# Patient Record
Sex: FEMALE | Race: WHITE | NOT HISPANIC OR LATINO | URBAN - METROPOLITAN AREA
[De-identification: names, ages, dates, MRNs, and addresses within clinical notes are randomized per-mention and may not be internally consistent; named-entity substitution may affect disease eponyms.]

---

## 2020-07-25 ENCOUNTER — TELEPHONE ENCOUNTER (OUTPATIENT)
Dept: URBAN - METROPOLITAN AREA CLINIC 13 | Facility: CLINIC | Age: 65
End: 2020-07-25

## 2020-07-25 RX ORDER — POLYETHYLENE GLYCOL 3350, SODIUM CHLORIDE, SODIUM BICARBONATE AND POTASSIUM CHLORIDE WITH LEMON FLAVOR 420; 11.2; 5.72; 1.48 G/4L; G/4L; G/4L; G/4L
TAKE 1/2 GALLON AT 5:00 PM DAY BEFORE PROCEDURE, TAKE SECOND 1/2 OF GALLON 6 HRS PRIOR TO PROCEDURE POWDER, FOR SOLUTION ORAL
Qty: 1 | Refills: 0 | OUTPATIENT
Start: 2018-03-19 | End: 2018-05-07

## 2020-07-25 RX ORDER — BEPOTASTINE BESILATE 15 MG/ML
SOLUTION/ DROPS OPHTHALMIC
Refills: 0 | OUTPATIENT
Start: 2013-01-28 | End: 2013-02-15

## 2020-07-25 RX ORDER — TRAVOPROST 0.04 MG/ML
INSTILL 1 DROP IN BOTH EYES AT BEDTIME SOLUTION/ DROPS OPHTHALMIC
Refills: 0 | OUTPATIENT
Start: 2013-01-28 | End: 2018-03-19

## 2020-07-26 ENCOUNTER — TELEPHONE ENCOUNTER (OUTPATIENT)
Dept: URBAN - METROPOLITAN AREA CLINIC 13 | Facility: CLINIC | Age: 65
End: 2020-07-26

## 2020-07-26 RX ORDER — BIMATOPROST 0.1 MG/ML
INSTILL 1 DROP BEDTIME SOLUTION/ DROPS OPHTHALMIC
Refills: 0 | Status: ACTIVE | COMMUNITY
Start: 2018-03-19

## 2020-07-26 RX ORDER — FEXOFENADINE HYDROCHLORIDE 60 MG/1
TAKE 1 TABLET DAILY TABLET, FILM COATED ORAL
Refills: 0 | Status: ACTIVE | COMMUNITY
Start: 2018-03-19

## 2020-07-26 RX ORDER — TRAVOPROST 0.04 MG/ML
INSTILL 1 DROP INTO BOTH EYES EVERY NIGHT AT BEDTIME SOLUTION/ DROPS OPHTHALMIC
Qty: 3 | Refills: 0 | Status: ACTIVE | COMMUNITY
Start: 2012-09-06

## 2020-07-26 RX ORDER — ACETAMINOPHEN 500 MG/1
TAKE 1 TABLET EVERY 4 TO 6 HOURS AS NEEDED TABLET, FILM COATED ORAL
Refills: 0 | Status: ACTIVE | COMMUNITY
Start: 2018-03-19

## 2020-07-26 RX ORDER — CYCLOSPORINE 0.5 MG/ML
INSTILL 1 DROP IN EACH EYE TWICE DAILY EMULSION OPHTHALMIC
Refills: 0 | Status: ACTIVE | COMMUNITY
Start: 2018-03-19

## 2020-07-26 RX ORDER — TOBRAMYCIN AND DEXAMETHASONE 3; 1 MG/G; MG/G
APPLY 1/4 INCH TO THE LASH LINE OF BOTH EYES WITH A FINGERTIP AT BEDTI OINTMENT OPHTHALMIC
Qty: 4 | Refills: 0 | Status: ACTIVE | COMMUNITY
Start: 2012-10-03

## 2020-07-26 RX ORDER — DORZOLAMIDE HYDROCHLORIDE 20 MG/ML
INSTILL 1 DROP DAILY SOLUTION OPHTHALMIC
Refills: 0 | Status: ACTIVE | COMMUNITY
Start: 2018-03-19

## 2020-07-26 RX ORDER — BRIMONIDINE TARTRATE, TIMOLOL MALEATE 2; 5 MG/ML; MG/ML
INSTILL 1 DROP INTO BOTH EYES TWICE A DAY SOLUTION/ DROPS OPHTHALMIC
Qty: 5 | Refills: 0 | Status: ACTIVE | COMMUNITY
Start: 2012-08-23

## 2020-07-26 RX ORDER — CEPHALEXIN 500 MG/1
TAKE ONE CAPSULE BY MOUTH 4 TIMES A DAY FOR 10 DAYS ,THEN 1 CAPSULE TW CAPSULE ORAL
Qty: 50 | Refills: 0 | Status: ACTIVE | COMMUNITY
Start: 2012-05-17

## 2020-07-26 RX ORDER — MULTIVITAMIN
TAKE 1 CAPSULE DAILY CAPSULE ORAL
Refills: 0 | Status: ACTIVE | COMMUNITY
Start: 2018-03-19

## 2023-12-12 ENCOUNTER — OFFICE VISIT (OUTPATIENT)
Dept: URBAN - METROPOLITAN AREA SURGERY CENTER 25 | Facility: SURGERY CENTER | Age: 68
End: 2023-12-12

## 2024-01-10 ENCOUNTER — LAB OUTSIDE AN ENCOUNTER (OUTPATIENT)
Dept: URBAN - METROPOLITAN AREA CLINIC 113 | Facility: CLINIC | Age: 69
End: 2024-01-10

## 2024-01-10 ENCOUNTER — OFFICE VISIT (OUTPATIENT)
Dept: URBAN - METROPOLITAN AREA CLINIC 113 | Facility: CLINIC | Age: 69
End: 2024-01-10
Payer: MEDICARE

## 2024-01-10 ENCOUNTER — DASHBOARD ENCOUNTERS (OUTPATIENT)
Age: 69
End: 2024-01-10

## 2024-01-10 VITALS
SYSTOLIC BLOOD PRESSURE: 187 MMHG | BODY MASS INDEX: 33.19 KG/M2 | WEIGHT: 194.4 LBS | RESPIRATION RATE: 20 BRPM | TEMPERATURE: 97.5 F | HEART RATE: 66 BPM | HEIGHT: 64 IN | DIASTOLIC BLOOD PRESSURE: 82 MMHG

## 2024-01-10 DIAGNOSIS — Z86.010 HISTORY OF ADENOMATOUS POLYP OF COLON: ICD-10-CM

## 2024-01-10 PROBLEM — 429047008: Status: ACTIVE | Noted: 2024-01-10

## 2024-01-10 PROCEDURE — 99202 OFFICE O/P NEW SF 15 MIN: CPT | Performed by: NURSE PRACTITIONER

## 2024-01-10 RX ORDER — CEPHALEXIN 500 MG/1
TAKE ONE CAPSULE BY MOUTH 4 TIMES A DAY FOR 10 DAYS ,THEN 1 CAPSULE TW CAPSULE ORAL
Qty: 50 | Refills: 0 | COMMUNITY
Start: 2012-05-17

## 2024-01-10 RX ORDER — MULTIVITAMIN
TAKE 1 CAPSULE DAILY CAPSULE ORAL
Refills: 0 | Status: ACTIVE | COMMUNITY
Start: 2018-03-19

## 2024-01-10 RX ORDER — ROSUVASTATIN CALCIUM 10 MG/1
1 TABLET TABLET, COATED ORAL ONCE A DAY
Status: ACTIVE | COMMUNITY

## 2024-01-10 RX ORDER — TOBRAMYCIN AND DEXAMETHASONE 3; 1 MG/G; MG/G
APPLY 1/4 INCH TO THE LASH LINE OF BOTH EYES WITH A FINGERTIP AT BEDTI OINTMENT OPHTHALMIC
Qty: 4 | Refills: 0 | COMMUNITY
Start: 2012-10-03

## 2024-01-10 RX ORDER — DORZOLAMIDE HYDROCHLORIDE 20 MG/ML
INSTILL 1 DROP DAILY SOLUTION OPHTHALMIC
Refills: 0 | COMMUNITY
Start: 2018-03-19

## 2024-01-10 RX ORDER — BIMATOPROST 0.1 MG/ML
INSTILL 1 DROP BEDTIME SOLUTION/ DROPS OPHTHALMIC
Refills: 0 | COMMUNITY
Start: 2018-03-19

## 2024-01-10 RX ORDER — CYCLOSPORINE 0.5 MG/ML
INSTILL 1 DROP IN EACH EYE TWICE DAILY EMULSION OPHTHALMIC
Refills: 0 | Status: ACTIVE | COMMUNITY
Start: 2018-03-19

## 2024-01-10 RX ORDER — ACETAMINOPHEN 500 MG/1
TAKE 1 TABLET EVERY 4 TO 6 HOURS AS NEEDED TABLET, FILM COATED ORAL
Refills: 0 | Status: ACTIVE | COMMUNITY
Start: 2018-03-19

## 2024-01-10 RX ORDER — FEXOFENADINE HYDROCHLORIDE 60 MG/1
TAKE 1 TABLET DAILY TABLET, FILM COATED ORAL
Refills: 0 | Status: ACTIVE | COMMUNITY
Start: 2018-03-19

## 2024-01-10 RX ORDER — NETARSUDIL AND LATANOPROST OPHTHALMIC SOLUTION, 0.02%/0.005% .2; .05 MG/ML; MG/ML
1 DROP INTO AFFECTED EYE SOLUTION/ DROPS OPHTHALMIC; TOPICAL ONCE A DAY
Status: ACTIVE | COMMUNITY

## 2024-01-10 RX ORDER — BRIMONIDINE TARTRATE, TIMOLOL MALEATE 2; 5 MG/ML; MG/ML
INSTILL 1 DROP INTO BOTH EYES TWICE A DAY SOLUTION/ DROPS OPHTHALMIC
Qty: 5 | Refills: 0 | Status: ACTIVE | COMMUNITY
Start: 2012-08-23

## 2024-01-10 RX ORDER — ESTRADIOL 0.5 MG/1
1 TABLET TABLET ORAL ONCE A DAY
Status: ACTIVE | COMMUNITY

## 2024-01-10 RX ORDER — TRAVOPROST 0.04 MG/ML
INSTILL 1 DROP INTO BOTH EYES EVERY NIGHT AT BEDTIME SOLUTION/ DROPS OPHTHALMIC
Qty: 3 | Refills: 0 | COMMUNITY
Start: 2012-09-06

## 2024-01-10 RX ORDER — POLYETHYLENE GLYCOL 3350, SODIUM CHLORIDE, SODIUM BICARBONATE, POTASSIUM CHLORIDE 420; 11.2; 5.72; 1.48 G/4L; G/4L; G/4L; G/4L
AS DIRECTED POWDER, FOR SOLUTION ORAL
Qty: 1 BOTTLE | Refills: 0 | OUTPATIENT
Start: 2024-01-10 | End: 2024-01-11

## 2024-01-10 NOTE — HPI-TODAY'S VISIT:
This is a 68-year-old woman with a history of hyperlipidemia, diverticulosis, and adenomatous colon polyps due surveillance in May 2023 referred from Dr. Dawson for screening colonoscopy.  She was last seen in the office 5/25/2018 for follow-up after colonoscopy notable for removal of a 5 mm adenoma, sigmoid diverticulosis, and grade 1 internal hemorrhoids.  She was to begin daily fiber and return in 2023 for a surveillance colonoscopy.  Since her last visit, she had bilateral cataract surgery.  She denies other changes in her medical or surgical history.  She denies any abdominal symptoms.

## 2024-01-19 ENCOUNTER — OFFICE VISIT (OUTPATIENT)
Dept: URBAN - METROPOLITAN AREA SURGERY CENTER 25 | Facility: SURGERY CENTER | Age: 69
End: 2024-01-19

## 2024-02-29 ENCOUNTER — COLON (OUTPATIENT)
Dept: URBAN - METROPOLITAN AREA SURGERY CENTER 25 | Facility: SURGERY CENTER | Age: 69
End: 2024-02-29

## 2024-02-29 RX ORDER — MULTIVITAMIN
TAKE 1 CAPSULE DAILY CAPSULE ORAL
Refills: 0 | Status: ACTIVE | COMMUNITY
Start: 2018-03-19

## 2024-02-29 RX ORDER — FEXOFENADINE HYDROCHLORIDE 60 MG/1
TAKE 1 TABLET DAILY TABLET, FILM COATED ORAL
Refills: 0 | Status: ACTIVE | COMMUNITY
Start: 2018-03-19

## 2024-02-29 RX ORDER — BIMATOPROST 0.1 MG/ML
INSTILL 1 DROP BEDTIME SOLUTION/ DROPS OPHTHALMIC
Refills: 0 | COMMUNITY
Start: 2018-03-19

## 2024-02-29 RX ORDER — DORZOLAMIDE HYDROCHLORIDE 20 MG/ML
INSTILL 1 DROP DAILY SOLUTION OPHTHALMIC
Refills: 0 | COMMUNITY
Start: 2018-03-19

## 2024-02-29 RX ORDER — ROSUVASTATIN CALCIUM 10 MG/1
1 TABLET TABLET, COATED ORAL ONCE A DAY
Status: ACTIVE | COMMUNITY

## 2024-02-29 RX ORDER — CYCLOSPORINE 0.5 MG/ML
INSTILL 1 DROP IN EACH EYE TWICE DAILY EMULSION OPHTHALMIC
Refills: 0 | Status: ACTIVE | COMMUNITY
Start: 2018-03-19

## 2024-02-29 RX ORDER — TRAVOPROST 0.04 MG/ML
INSTILL 1 DROP INTO BOTH EYES EVERY NIGHT AT BEDTIME SOLUTION/ DROPS OPHTHALMIC
Qty: 3 | Refills: 0 | COMMUNITY
Start: 2012-09-06

## 2024-02-29 RX ORDER — NETARSUDIL AND LATANOPROST OPHTHALMIC SOLUTION, 0.02%/0.005% .2; .05 MG/ML; MG/ML
1 DROP INTO AFFECTED EYE SOLUTION/ DROPS OPHTHALMIC; TOPICAL ONCE A DAY
Status: ACTIVE | COMMUNITY

## 2024-02-29 RX ORDER — ACETAMINOPHEN 500 MG/1
TAKE 1 TABLET EVERY 4 TO 6 HOURS AS NEEDED TABLET, FILM COATED ORAL
Refills: 0 | Status: ACTIVE | COMMUNITY
Start: 2018-03-19

## 2024-02-29 RX ORDER — ESTRADIOL 0.5 MG/1
1 TABLET TABLET ORAL ONCE A DAY
Status: ACTIVE | COMMUNITY

## 2024-02-29 RX ORDER — TOBRAMYCIN AND DEXAMETHASONE 3; 1 MG/G; MG/G
APPLY 1/4 INCH TO THE LASH LINE OF BOTH EYES WITH A FINGERTIP AT BEDTI OINTMENT OPHTHALMIC
Qty: 4 | Refills: 0 | COMMUNITY
Start: 2012-10-03

## 2024-02-29 RX ORDER — CEPHALEXIN 500 MG/1
TAKE ONE CAPSULE BY MOUTH 4 TIMES A DAY FOR 10 DAYS ,THEN 1 CAPSULE TW CAPSULE ORAL
Qty: 50 | Refills: 0 | COMMUNITY
Start: 2012-05-17

## 2024-02-29 RX ORDER — BRIMONIDINE TARTRATE, TIMOLOL MALEATE 2; 5 MG/ML; MG/ML
INSTILL 1 DROP INTO BOTH EYES TWICE A DAY SOLUTION/ DROPS OPHTHALMIC
Qty: 5 | Refills: 0 | Status: ACTIVE | COMMUNITY
Start: 2012-08-23